# Patient Record
Sex: MALE | Race: WHITE | NOT HISPANIC OR LATINO
[De-identification: names, ages, dates, MRNs, and addresses within clinical notes are randomized per-mention and may not be internally consistent; named-entity substitution may affect disease eponyms.]

---

## 2017-01-05 ENCOUNTER — TRANSCRIPTION ENCOUNTER (OUTPATIENT)
Age: 47
End: 2017-01-05

## 2018-06-13 ENCOUNTER — MEDICATION RENEWAL (OUTPATIENT)
Age: 48
End: 2018-06-13

## 2019-02-20 ENCOUNTER — APPOINTMENT (OUTPATIENT)
Dept: CARDIOLOGY | Facility: CLINIC | Age: 49
End: 2019-02-20

## 2019-02-20 DIAGNOSIS — Z78.9 OTHER SPECIFIED HEALTH STATUS: ICD-10-CM

## 2019-02-26 PROBLEM — Z78.9 DOES NOT USE ILLICIT DRUGS: Status: ACTIVE | Noted: 2019-02-26

## 2019-02-26 PROBLEM — Z78.9 NON-SMOKER: Status: ACTIVE | Noted: 2019-02-26

## 2019-02-27 ENCOUNTER — RECORD ABSTRACTING (OUTPATIENT)
Age: 49
End: 2019-02-27

## 2019-02-28 ENCOUNTER — APPOINTMENT (OUTPATIENT)
Dept: CARDIOLOGY | Facility: CLINIC | Age: 49
End: 2019-02-28
Payer: COMMERCIAL

## 2019-02-28 ENCOUNTER — NON-APPOINTMENT (OUTPATIENT)
Age: 49
End: 2019-02-28

## 2019-02-28 VITALS
WEIGHT: 155 LBS | HEART RATE: 63 BPM | SYSTOLIC BLOOD PRESSURE: 110 MMHG | HEIGHT: 64 IN | BODY MASS INDEX: 26.46 KG/M2 | RESPIRATION RATE: 16 BRPM | DIASTOLIC BLOOD PRESSURE: 80 MMHG

## 2019-02-28 DIAGNOSIS — Z00.00 ENCOUNTER FOR GENERAL ADULT MEDICAL EXAMINATION W/OUT ABNORMAL FINDINGS: ICD-10-CM

## 2019-02-28 LAB
BASOPHILS # BLD AUTO: 0.07 K/UL
BASOPHILS NFR BLD AUTO: 1 %
EOSINOPHIL # BLD AUTO: 0.17 K/UL
EOSINOPHIL NFR BLD AUTO: 2.4 %
HCT VFR BLD CALC: 46.9 %
HGB BLD-MCNC: 16 G/DL
IMM GRANULOCYTES NFR BLD AUTO: 0.3 %
LYMPHOCYTES # BLD AUTO: 1.89 K/UL
LYMPHOCYTES NFR BLD AUTO: 27 %
MAN DIFF?: NORMAL
MCHC RBC-ENTMCNC: 29.6 PG
MCHC RBC-ENTMCNC: 34.1 GM/DL
MCV RBC AUTO: 86.7 FL
MONOCYTES # BLD AUTO: 0.6 K/UL
MONOCYTES NFR BLD AUTO: 8.6 %
NEUTROPHILS # BLD AUTO: 4.26 K/UL
NEUTROPHILS NFR BLD AUTO: 60.7 %
PLATELET # BLD AUTO: 289 K/UL
RBC # BLD: 5.41 M/UL
RBC # FLD: 12.6 %
WBC # FLD AUTO: 7.01 K/UL

## 2019-02-28 PROCEDURE — 99213 OFFICE O/P EST LOW 20 MIN: CPT

## 2019-02-28 PROCEDURE — 93000 ELECTROCARDIOGRAM COMPLETE: CPT

## 2019-03-01 LAB
ALBUMIN SERPL ELPH-MCNC: 4.8 G/DL
ALP BLD-CCNC: 68 U/L
ALT SERPL-CCNC: 20 U/L
ANION GAP SERPL CALC-SCNC: 13 MMOL/L
AST SERPL-CCNC: 22 U/L
BILIRUB SERPL-MCNC: 0.6 MG/DL
BUN SERPL-MCNC: 24 MG/DL
CALCIUM SERPL-MCNC: 9.7 MG/DL
CHLORIDE SERPL-SCNC: 102 MMOL/L
CHOLEST SERPL-MCNC: 234 MG/DL
CHOLEST/HDLC SERPL: 5.3 RATIO
CO2 SERPL-SCNC: 26 MMOL/L
CREAT SERPL-MCNC: 0.94 MG/DL
GLUCOSE SERPL-MCNC: 98 MG/DL
HDLC SERPL-MCNC: 44 MG/DL
LDLC SERPL CALC-MCNC: 178 MG/DL
POTASSIUM SERPL-SCNC: 5.3 MMOL/L
PROT SERPL-MCNC: 7.7 G/DL
PSA SERPL-MCNC: 1.12 NG/ML
SODIUM SERPL-SCNC: 141 MMOL/L
TRIGL SERPL-MCNC: 58 MG/DL
TSH SERPL-ACNC: 0.79 UIU/ML

## 2019-03-01 NOTE — ASSESSMENT
[FreeTextEntry1] : ECG:  Normal sinus rhythm at 63 with delayed anterior R-wave progression.  No significant ST-T wave changes.  There is no other laboratory data available at this time. \par \par Impression:  \par 1.  Atypical chest pain syndrome.  Reassured the patient that it is unlikely due to anything cardiac and more likely musculoskeletal due to his workout routines.  \par 2.  Blood pressure is well controlled but taking a piece of Bystolic 5 seems to be an inconsistent way to dose him and I suggested that we decrease it to 2.5 mg pills daily.  He is agreeable and a prescription was sent.\par 3.  He has had no recent bloodwork and I gave him a prescription for a full set. \par 4.  Asked him to continue with regular exercise regimen, contact with regard to any new symptoms.  Otherwise follow up here in one year. \par

## 2019-03-01 NOTE — REASON FOR VISIT
[FreeTextEntry1] : This is a 48-year-old male last seen here approximately two years ago presenting for cardiac evaluation.\par \par The patient is physically very active, exercises regularly.  He denies any shortness of breath, palpitations, PND, orthopnea, or edema. He has been on Bystolic but taking only a part of it daily with good relief of his palpitations.  Blood pressure has been well controlled.  \par \par He is having some occasional chest discomfort.  It is focal, sharp, and very short-lived.  There is no radiation or any association with any other symptoms.  \par \par He has no other new medical problems.  \par \par He watches his sodium and caffeine intake.  \par \par Blood pressure has really not been an issue for quite some time.   \par

## 2019-03-04 ENCOUNTER — CLINICAL ADVICE (OUTPATIENT)
Age: 49
End: 2019-03-04

## 2019-07-10 ENCOUNTER — TRANSCRIPTION ENCOUNTER (OUTPATIENT)
Age: 49
End: 2019-07-10

## 2020-04-23 ENCOUNTER — RX RENEWAL (OUTPATIENT)
Age: 50
End: 2020-04-23

## 2021-01-05 ENCOUNTER — RX RENEWAL (OUTPATIENT)
Age: 51
End: 2021-01-05

## 2021-08-29 ENCOUNTER — RX RENEWAL (OUTPATIENT)
Age: 51
End: 2021-08-29

## 2021-09-03 ENCOUNTER — RX RENEWAL (OUTPATIENT)
Age: 51
End: 2021-09-03

## 2021-10-21 ENCOUNTER — APPOINTMENT (OUTPATIENT)
Dept: CARDIOLOGY | Facility: CLINIC | Age: 51
End: 2021-10-21
Payer: COMMERCIAL

## 2021-10-21 VITALS
OXYGEN SATURATION: 98 % | HEIGHT: 64 IN | HEART RATE: 62 BPM | RESPIRATION RATE: 16 BRPM | WEIGHT: 164 LBS | BODY MASS INDEX: 28 KG/M2 | SYSTOLIC BLOOD PRESSURE: 140 MMHG | DIASTOLIC BLOOD PRESSURE: 85 MMHG

## 2021-10-21 DIAGNOSIS — R07.89 OTHER CHEST PAIN: ICD-10-CM

## 2021-10-21 PROCEDURE — 99214 OFFICE O/P EST MOD 30 MIN: CPT

## 2021-10-21 PROCEDURE — 93000 ELECTROCARDIOGRAM COMPLETE: CPT

## 2021-10-21 RX ORDER — ROSUVASTATIN CALCIUM 10 MG/1
10 TABLET, FILM COATED ORAL DAILY
Qty: 90 | Refills: 3 | Status: DISCONTINUED | COMMUNITY
Start: 2019-03-01 | End: 2021-10-21

## 2021-10-21 NOTE — ASSESSMENT
[FreeTextEntry1] : ECG:  Normal sinus rhythm at 62  with delayed anterior R-wave progression.  No significant ST-T wave changes.  \par \par There is no other laboratory data available at this time. \par \par Impression:  \par 1. HX of atypical chest pain syndrome which has not been an issue as of recent.\par 2. ECG with PRWP and understands this is his baseline abnormality. .  \par 2.  BP today acceptable for now but reflective of taking his Bystolic intermittently. Seems to run better at home. \par 3. Unsure of lipid status\par 4. Poor sleep pattern but changing his work shift. \par 5. Up 9 lbs since 2019\par 6. No signs s of HF\par \par Plan\par 1. Full panel BW due now\par 2. Exercise and watch diet\par 3. Continue Bystolic QD\par 4. Watch BPs if systolics are in the 130s-140s call office. \par 5. No indication for any cardiac testing at this time as he is without any symptoms. \par \par \par Clinical follow up in oe year \par

## 2021-10-21 NOTE — REASON FOR VISIT
[FreeTextEntry1] : This is a 51-year-old male last seen here approximately two years ago presenting for cardiac evaluation.\par \par The patient is physically very active but not exercising.  He denies any shortness of breath, PND, orthopnea, or edema. Would like to have his lipids checked and some baseline testing.\par \par Acknowledges that he gained weight during the pandemic. 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 When he misses a dose he experiences short lived palps w/o any real concern. BPs at home running 125-140/70-80\par \par He has no other new medical problems.  \par \par Continues to watch his sodium and caffeine intake.  \par

## 2021-12-07 ENCOUNTER — RX RENEWAL (OUTPATIENT)
Age: 51
End: 2021-12-07

## 2021-12-27 DIAGNOSIS — E78.5 HYPERLIPIDEMIA, UNSPECIFIED: ICD-10-CM

## 2022-12-12 ENCOUNTER — RX RENEWAL (OUTPATIENT)
Age: 52
End: 2022-12-12

## 2023-10-18 ENCOUNTER — NON-APPOINTMENT (OUTPATIENT)
Age: 53
End: 2023-10-18

## 2023-11-13 ENCOUNTER — APPOINTMENT (OUTPATIENT)
Dept: CARDIOLOGY | Facility: CLINIC | Age: 53
End: 2023-11-13
Payer: COMMERCIAL

## 2023-11-13 VITALS
RESPIRATION RATE: 16 BRPM | OXYGEN SATURATION: 98 % | WEIGHT: 167 LBS | SYSTOLIC BLOOD PRESSURE: 150 MMHG | BODY MASS INDEX: 28.51 KG/M2 | HEART RATE: 63 BPM | DIASTOLIC BLOOD PRESSURE: 100 MMHG | HEIGHT: 64 IN

## 2023-11-13 DIAGNOSIS — I10 ESSENTIAL (PRIMARY) HYPERTENSION: ICD-10-CM

## 2023-11-13 DIAGNOSIS — E78.49 OTHER HYPERLIPIDEMIA: ICD-10-CM

## 2023-11-13 PROCEDURE — 93000 ELECTROCARDIOGRAM COMPLETE: CPT

## 2023-11-13 PROCEDURE — 99214 OFFICE O/P EST MOD 30 MIN: CPT | Mod: 25

## 2023-11-13 RX ORDER — ROSUVASTATIN CALCIUM 10 MG/1
10 TABLET, FILM COATED ORAL
Qty: 90 | Refills: 1 | Status: DISCONTINUED | COMMUNITY
Start: 2021-12-27 | End: 2023-11-13

## 2023-11-13 RX ORDER — NEBIVOLOL 2.5 MG/1
2.5 TABLET ORAL
Qty: 30 | Refills: 0 | Status: DISCONTINUED | COMMUNITY
Start: 2018-06-13 | End: 2023-11-13

## 2023-11-13 RX ORDER — NEBIVOLOL 5 MG/1
5 TABLET ORAL
Qty: 90 | Refills: 0 | Status: ACTIVE | COMMUNITY
Start: 2023-03-17

## 2023-12-10 ENCOUNTER — RX RENEWAL (OUTPATIENT)
Age: 53
End: 2023-12-10

## 2023-12-12 ENCOUNTER — APPOINTMENT (OUTPATIENT)
Dept: CARDIOLOGY | Facility: CLINIC | Age: 53
End: 2023-12-12
Payer: COMMERCIAL

## 2023-12-12 PROCEDURE — 93306 TTE W/DOPPLER COMPLETE: CPT

## 2025-09-16 ENCOUNTER — APPOINTMENT (OUTPATIENT)
Dept: CARDIOLOGY | Facility: CLINIC | Age: 55
End: 2025-09-16
Payer: COMMERCIAL

## 2025-09-16 VITALS
WEIGHT: 168 LBS | RESPIRATION RATE: 16 BRPM | BODY MASS INDEX: 28.68 KG/M2 | HEART RATE: 61 BPM | SYSTOLIC BLOOD PRESSURE: 142 MMHG | OXYGEN SATURATION: 98 % | DIASTOLIC BLOOD PRESSURE: 80 MMHG | HEIGHT: 64 IN

## 2025-09-16 DIAGNOSIS — I10 ESSENTIAL (PRIMARY) HYPERTENSION: ICD-10-CM

## 2025-09-16 DIAGNOSIS — E78.5 HYPERLIPIDEMIA, UNSPECIFIED: ICD-10-CM

## 2025-09-16 DIAGNOSIS — E78.49 OTHER HYPERLIPIDEMIA: ICD-10-CM

## 2025-09-16 PROCEDURE — 93000 ELECTROCARDIOGRAM COMPLETE: CPT

## 2025-09-16 PROCEDURE — G2211 COMPLEX E/M VISIT ADD ON: CPT | Mod: NC

## 2025-09-16 PROCEDURE — 99214 OFFICE O/P EST MOD 30 MIN: CPT

## 2025-09-16 RX ORDER — AMLODIPINE BESYLATE 5 MG/1
5 TABLET ORAL DAILY
Qty: 90 | Refills: 3 | Status: ACTIVE | COMMUNITY
Start: 2025-09-16 | End: 1900-01-01